# Patient Record
Sex: FEMALE | Race: WHITE | NOT HISPANIC OR LATINO | Employment: OTHER | URBAN - METROPOLITAN AREA
[De-identification: names, ages, dates, MRNs, and addresses within clinical notes are randomized per-mention and may not be internally consistent; named-entity substitution may affect disease eponyms.]

---

## 2018-03-09 ENCOUNTER — CLINICAL SUPPORT (OUTPATIENT)
Dept: PRIMARY CARE CLINIC | Facility: CLINIC | Age: 38
End: 2018-03-09
Payer: MEDICAID

## 2018-03-09 ENCOUNTER — TELEPHONE (OUTPATIENT)
Dept: PRIMARY CARE CLINIC | Facility: CLINIC | Age: 38
End: 2018-03-09

## 2018-03-09 DIAGNOSIS — T14.8XXA PUNCTURE WOUND: Primary | ICD-10-CM

## 2018-03-09 PROCEDURE — 99999 PR PBB SHADOW E&M-EST. PATIENT-LVL II: CPT | Mod: PBBFAC,,,

## 2018-03-09 PROCEDURE — 99212 OFFICE O/P EST SF 10 MIN: CPT | Mod: PBBFAC,PN

## 2018-03-09 PROCEDURE — 90471 IMMUNIZATION ADMIN: CPT | Mod: PBBFAC,PN

## 2018-03-09 NOTE — TELEPHONE ENCOUNTER
----- Message from Fide Flaherty sent at 3/9/2018 12:47 PM CST -----  Pt called yesterday / no responce / needs a tetnus injection ... Asking to be seen with anyone today / [phone]

## 2018-03-09 NOTE — TELEPHONE ENCOUNTER
Spoke with patient to notify her that we do have Tetanus injections. Therefore scheduled appointment for a nurse visit today

## 2018-03-09 NOTE — TELEPHONE ENCOUNTER
----- Message from Marlee Gillespie sent at 3/8/2018 12:40 PM CST -----  Please call patient in regards to requesting a tetanus shot, [phone] (imkv)

## 2018-03-09 NOTE — PROGRESS NOTES
"Pt ID verified by  and Name. Allergies verified. Pt reports that she was stuck by something while in an old [place] yesterday. Pt reports being nervous about getting injection. I notified to pt that she did receive the shot in  according to her [place] registry. Pt denies having any reactions to vaccines in the past. Pt reports that she has PTSD when it comes to "stuff like this." Explained to pt in detail about the Tdap vaccine while reading to her the Vaccination information sheet. Anitra Waddell NP also spoke with pt about the Tdap vaccine. Pt stated, "I just need to do it because I cut stuck by something." I told pt that I was not going to give her the shot until she gave me the ok. I prepped pt with alcohol and administered Tdap vaccine to R Deltoid when she said to. Pt stated after the vaccine was given, "Oh it didn't even feel like I got anything." Pt waited in room 12 with door open for 15 minutes after injection. Pt tolerated well. No reactions noted.   "

## 2018-04-12 ENCOUNTER — TELEPHONE (OUTPATIENT)
Dept: PRIMARY CARE CLINIC | Facility: CLINIC | Age: 38
End: 2018-04-12

## 2018-04-12 DIAGNOSIS — R00.0 TACHYCARDIA: Primary | ICD-10-CM

## 2018-04-12 NOTE — TELEPHONE ENCOUNTER
----- Message from Susan Ramírez sent at 4/12/2018  2:01 PM CDT -----  Contact: self  Patient would like a referral to cardiology. Patient has been to ER regarding heart is beating very fast. Please call patient at [phone].thanks!

## 2018-06-20 ENCOUNTER — TELEPHONE (OUTPATIENT)
Dept: CARDIOLOGY | Facility: CLINIC | Age: 38
End: 2018-06-20

## 2018-06-20 ENCOUNTER — OFFICE VISIT (OUTPATIENT)
Dept: CARDIOLOGY | Facility: CLINIC | Age: 38
End: 2018-06-20
Payer: MEDICAID

## 2018-06-20 VITALS
HEIGHT: 60 IN | BODY MASS INDEX: 27.02 KG/M2 | WEIGHT: 137.63 LBS | SYSTOLIC BLOOD PRESSURE: 105 MMHG | HEART RATE: 91 BPM | DIASTOLIC BLOOD PRESSURE: 57 MMHG

## 2018-06-20 DIAGNOSIS — E87.6 HYPOKALEMIA: ICD-10-CM

## 2018-06-20 DIAGNOSIS — I47.9 PAROXYSMAL TACHYCARDIA: ICD-10-CM

## 2018-06-20 DIAGNOSIS — F43.10 PTSD (POST-TRAUMATIC STRESS DISORDER): ICD-10-CM

## 2018-06-20 PROCEDURE — 99999 PR PBB SHADOW E&M-EST. PATIENT-LVL III: CPT | Mod: PBBFAC,,, | Performed by: INTERNAL MEDICINE

## 2018-06-20 PROCEDURE — 99213 OFFICE O/P EST LOW 20 MIN: CPT | Mod: PBBFAC,PN | Performed by: INTERNAL MEDICINE

## 2018-06-20 PROCEDURE — 99203 OFFICE O/P NEW LOW 30 MIN: CPT | Mod: S$PBB,,, | Performed by: INTERNAL MEDICINE

## 2018-06-20 RX ORDER — SULFAMETHOXAZOLE AND TRIMETHOPRIM 800; 160 MG/1; MG/1
TABLET ORAL
Refills: 0 | COMMUNITY
Start: 2018-06-09 | End: 2018-08-28

## 2018-06-20 NOTE — TELEPHONE ENCOUNTER
----- Message from Demar Sen MD sent at 6/20/2018  2:48 PM CDT -----  Please inform the patient that her lab is all normal including potassium.

## 2018-06-20 NOTE — LETTER
June 20, 2018      Robb Jha MD  [address] [place] [address]           [facility]  [address]  [place] [address]  Phone: [phone]  Fax: [phone]          Patient: Nancy Zambrano   MR Number: [id number]   YOB: 1980   Date of Visit: 6/20/2018       Dear Dr. Robb Jha:    Thank you for referring Nancy Zambrano to me for evaluation. Attached you will find relevant portions of my assessment and plan of care.    If you have questions, please do not hesitate to call me. I look forward to following Nancy Zambrano along with you.    Sincerely,    Demar Sen MD    Enclosure  CC:  No Recipients    If you would like to receive this communication electronically, please contact [email or web address] or [phone] to request more information on [email or web address] access.    For providers and/or their staff who would like to refer a patient to [facility], please contact us through our one-stop-shop provider referral line, [facility], at [phone].    If you feel you have received this communication in error or would no longer like to receive these types of communications, please e-mail [email or web address]

## 2018-06-20 NOTE — PROGRESS NOTES
Subjective:   Patient ID:  Nancy Zambrano is a 37 y.o. female who presents for evaluation of Tachycardia      HPI:   Nancy Zambrano presents for evaluation of recurrent heart racing. The patient has had 9 ED visits in the past 6 months for anxiety, orthostatic sxs, and tachycardia. She notes that for no apparent reason her heart will begin beating rapidly. Per ED records she has had orthostatic sxs with dizziness  during the episodes. Indicates the episodes will suddenly start and stop. Heart rates have varied from 130-180/. Has 2 episodes daily.  No exertional sxs. She has worn a 48 hour Holter monitor but has not as yet turned it in. She is vaping having stopped smoking. She has PTSD and during some episodes has had perioral paresthesias.   Review of Systems   Constitution: Negative for weakness, malaise/fatigue, weight gain and weight loss.   Eyes: Negative for blurred vision.   Cardiovascular: Positive for palpitations. Negative for chest pain, claudication, cyanosis, dyspnea on exertion, irregular heartbeat, leg swelling, near-syncope, orthopnea, paroxysmal nocturnal dyspnea and syncope.   Respiratory: Negative for cough, shortness of breath and wheezing.    Musculoskeletal: Negative for falls and myalgias.        Scoliosis.   Gastrointestinal: Positive for dysphagia. Negative for abdominal pain, heartburn, nausea and vomiting.   Genitourinary: Negative for nocturia.   Neurological: Positive for dizziness, headaches and paresthesias. Negative for brief paralysis, focal weakness and numbness.   Psychiatric/Behavioral: Negative for altered mental status. The patient is nervous/anxious.        Current Outpatient Prescriptions   Medication Sig    diazePAM (VALIUM) 10 MG Tab Take 5 mg by mouth every 6 (six) hours as needed.    oxyCODONE-acetaminophen (PERCOCET) 7.5-325 mg per tablet Take 1 tablet by mouth every 6 (six) hours as needed.    sulfamethoxazole-trimethoprim 800-160mg (BACTRIM DS) 800-160 mg Tab  TK 1 T PO  BID     No current facility-administered medications for this visit.      Objective:   Physical Exam   Constitutional: She is oriented to person, place, and time. She appears well-developed. No distress.   BP (!) 105/57 (BP Location: Left arm)   Pulse 91   Ht 5' (1.524 m)   Wt 62.4 kg (137 lb 9.6 oz)   BMI 26.87 kg/m²    HENT:   Head: Normocephalic.   Eyes: Conjunctivae are normal. Pupils are equal, round, and reactive to light. No scleral icterus.   Neck: Neck supple. No JVD present. No thyromegaly present.   Cardiovascular: Normal rate, regular rhythm, normal heart sounds and intact distal pulses.  PMI is not displaced.  Exam reveals no gallop and no friction rub.    No murmur heard.  Pulmonary/Chest: Effort normal and breath sounds normal. No respiratory distress. She has no wheezes. She has no rales.   Abdominal: Soft. She exhibits no distension. There is no splenomegaly or hepatomegaly. There is no tenderness.   Musculoskeletal: She exhibits no edema or tenderness.   Gait normal   Neurological: She is alert and oriented to person, place, and time.   Skin: Skin is warm and dry. She is not diaphoretic.   Psychiatric: She has a normal mood and affect. Her behavior is normal.       Lab Results   Component Value Date     05/28/2018    K 3.3 (L) 05/28/2018     05/28/2018    CO2 25 05/28/2018    BUN 10 05/28/2018    CREATININE 0.8 05/28/2018    GLU 99 05/28/2018    MG 2.0 04/30/2018    AST 17 05/28/2018    ALT 14 05/28/2018    ALBUMIN 4.3 05/28/2018    PROT 7.7 05/28/2018    BILITOT 0.1 (L) 05/28/2018    WBC 10.10 05/28/2018    HGB 12.5 05/28/2018    HCT 36.9 (L) 05/28/2018    MCV 87 05/28/2018     05/28/2018    TSH 1.180 05/10/2018       Assessment:     1. Paroxysmal tachycardia : Consider PSVT but may be symptomatic sinus tachycardia   2. Hypokalemia ; K+ low last 2 ED visits   3. PTSD (post-traumatic stress disorder)        Plan:     Nancy was seen today for  tachycardia.    Diagnoses and all orders for this visit:    Paroxysmal tachycardia    -     Transthoracic echo (TTE) complete ([facility] ONLY); Future  Awaiting the results of the Holter. If PSVT, will refer to EP.  Discussed weaning off all Nicotine.  Hypokalemia  -     Basic metabolic panel; Future; Expected date: 06/20/2018  -     Magnesium; Future; Expected date: 06/20/2018    PTSD (post-traumatic stress disorder)    Other orders  -     sulfamethoxazole-trimethoprim 800-160mg (BACTRIM DS) 800-160 mg Tab; TK 1 T PO  BID

## 2018-06-25 ENCOUNTER — TELEPHONE (OUTPATIENT)
Dept: CARDIOLOGY | Facility: CLINIC | Age: 38
End: 2018-06-25

## 2018-06-25 NOTE — TELEPHONE ENCOUNTER
----- Message from Arianna Boyer MA sent at 6/20/2018 10:37 AM CDT -----  Check and make sure Holter monitor has been dropped off and uploaded and resulted to Dr Sen.

## 2018-06-25 NOTE — TELEPHONE ENCOUNTER
Spoke with patient. Stated that she turned the monitor in at the [facility] during a visit this weekend.

## 2019-05-22 ENCOUNTER — TELEPHONE (OUTPATIENT)
Dept: PRIMARY CARE CLINIC | Facility: CLINIC | Age: 39
End: 2019-05-22

## 2019-05-22 RX ORDER — SULFAMETHOXAZOLE AND TRIMETHOPRIM 800; 160 MG/1; MG/1
1 TABLET ORAL 2 TIMES DAILY
Qty: 30 TABLET | Refills: 0 | OUTPATIENT
Start: 2019-05-22

## 2019-05-22 NOTE — TELEPHONE ENCOUNTER
----- Message from Susan Ramírez sent at 5/22/2019  3:51 PM CDT -----  Contact: self  Type:  RX Refill Request    Who Called:  self  Refill or New Rx:  refill  RX Name and Strength:   Bactrim  How is the patient currently taking it? (ex. 1XDay):    Is this a 30 day or 90 day RX:    Preferred Pharmacy with phone number:  [facility]   Local or Mail Order:  local  Ordering Provider:  Dr Abhijeet Pineda Call Back Number:  [phone]  Additional Information:  Patient left a previous message and would like a call back regarding the medication. Patient states pharmacy sent request.  Thanks!

## 2019-05-22 NOTE — TELEPHONE ENCOUNTER
----- Message from Fide Swain sent at 5/22/2019 12:37 PM CDT -----  Contact: Patient  Type:  RX Refill Request    Who Called:  Nancy patient  Refill or New Rx:  Refill  RX Name and Strength:  Bactrim  How is the patient currently taking it? (ex. 1XDay):  1 tablet ever 12 hours  Is this a 30 day or 90 day RX:  30  Preferred Pharmacy with phone number:   Phone: [phone] Fax: [phone]  [facility]  Local or Mail Order:  Local  Ordering Provider:  Dr Abhijeet Pineda Call Back Number:  [phone]  Additional Information:  Please advise. Thanks.

## 2019-05-23 NOTE — TELEPHONE ENCOUNTER
Let pt know she has not been seen in office more than 2 -3 yrs she need f/u in office and have labs U/A before treatments

## 2019-09-24 ENCOUNTER — TELEPHONE (OUTPATIENT)
Dept: PRIMARY CARE CLINIC | Facility: CLINIC | Age: 39
End: 2019-09-24

## 2019-09-24 DIAGNOSIS — Z91.199 NONCOMPLIANCE: Primary | ICD-10-CM

## 2021-11-12 ENCOUNTER — TELEPHONE (OUTPATIENT)
Dept: OBSTETRICS AND GYNECOLOGY | Facility: CLINIC | Age: 41
End: 2021-11-12
Payer: MEDICAID

## 2021-11-19 ENCOUNTER — OFFICE VISIT (OUTPATIENT)
Dept: OBSTETRICS AND GYNECOLOGY | Facility: CLINIC | Age: 41
End: 2021-11-19
Payer: MEDICAID

## 2021-11-19 VITALS
HEIGHT: 61 IN | DIASTOLIC BLOOD PRESSURE: 76 MMHG | SYSTOLIC BLOOD PRESSURE: 118 MMHG | WEIGHT: 133.19 LBS | BODY MASS INDEX: 25.14 KG/M2

## 2021-11-19 DIAGNOSIS — R53.83 FATIGUE, UNSPECIFIED TYPE: ICD-10-CM

## 2021-11-19 DIAGNOSIS — Z01.419 WELL WOMAN EXAM: Primary | ICD-10-CM

## 2021-11-19 PROCEDURE — 99999 PR PBB SHADOW E&M-EST. PATIENT-LVL III: CPT | Mod: PBBFAC,,, | Performed by: STUDENT IN AN ORGANIZED HEALTH CARE EDUCATION/TRAINING PROGRAM

## 2021-11-19 PROCEDURE — 99213 OFFICE O/P EST LOW 20 MIN: CPT | Mod: PBBFAC,PN | Performed by: STUDENT IN AN ORGANIZED HEALTH CARE EDUCATION/TRAINING PROGRAM

## 2021-11-19 PROCEDURE — 99386 PREV VISIT NEW AGE 40-64: CPT | Mod: S$PBB,,, | Performed by: STUDENT IN AN ORGANIZED HEALTH CARE EDUCATION/TRAINING PROGRAM

## 2021-11-19 PROCEDURE — 87624 HPV HI-RISK TYP POOLED RSLT: CPT | Performed by: STUDENT IN AN ORGANIZED HEALTH CARE EDUCATION/TRAINING PROGRAM

## 2021-11-19 PROCEDURE — 99386 PR PREVENTIVE VISIT,NEW,40-64: ICD-10-PCS | Mod: S$PBB,,, | Performed by: STUDENT IN AN ORGANIZED HEALTH CARE EDUCATION/TRAINING PROGRAM

## 2021-11-19 PROCEDURE — 88175 CYTOPATH C/V AUTO FLUID REDO: CPT | Performed by: STUDENT IN AN ORGANIZED HEALTH CARE EDUCATION/TRAINING PROGRAM

## 2021-11-19 PROCEDURE — 99999 PR PBB SHADOW E&M-EST. PATIENT-LVL III: ICD-10-PCS | Mod: PBBFAC,,, | Performed by: STUDENT IN AN ORGANIZED HEALTH CARE EDUCATION/TRAINING PROGRAM

## 2021-11-24 LAB
HPV HR 12 DNA SPEC QL NAA+PROBE: NEGATIVE
HPV16 AG SPEC QL: NEGATIVE
HPV18 DNA SPEC QL NAA+PROBE: NEGATIVE

## 2021-11-26 LAB
FINAL PATHOLOGIC DIAGNOSIS: NORMAL
Lab: NORMAL

## 2021-12-14 ENCOUNTER — LAB VISIT (OUTPATIENT)
Dept: LAB | Facility: HOSPITAL | Age: 41
End: 2021-12-14
Attending: STUDENT IN AN ORGANIZED HEALTH CARE EDUCATION/TRAINING PROGRAM
Payer: MEDICAID

## 2021-12-14 ENCOUNTER — HOSPITAL ENCOUNTER (OUTPATIENT)
Dept: RADIOLOGY | Facility: HOSPITAL | Age: 41
Discharge: HOME | End: 2021-12-14
Attending: STUDENT IN AN ORGANIZED HEALTH CARE EDUCATION/TRAINING PROGRAM
Payer: MEDICAID

## 2021-12-14 DIAGNOSIS — Z01.419 WELL WOMAN EXAM: ICD-10-CM

## 2021-12-14 DIAGNOSIS — R79.89 ABNORMAL TSH: ICD-10-CM

## 2021-12-14 DIAGNOSIS — Z12.31 VISIT FOR SCREENING MAMMOGRAM: ICD-10-CM

## 2021-12-14 DIAGNOSIS — R53.83 FATIGUE, UNSPECIFIED TYPE: Primary | ICD-10-CM

## 2021-12-14 LAB
T4 FREE SERPL-MCNC: 0.81 NG/DL (ref 0.71–1.51)
TSH SERPL DL<=0.005 MIU/L-ACNC: 6.04 UIU/ML (ref 0.4–4)

## 2021-12-14 PROCEDURE — 77063 MAMMO DIGITAL SCREENING BILAT WITH TOMO: ICD-10-PCS | Mod: 26,,, | Performed by: RADIOLOGY

## 2021-12-14 PROCEDURE — 77067 MAMMO DIGITAL SCREENING BILAT WITH TOMO: ICD-10-PCS | Mod: 26,,, | Performed by: RADIOLOGY

## 2021-12-14 PROCEDURE — 77063 BREAST TOMOSYNTHESIS BI: CPT | Mod: 26,,, | Performed by: RADIOLOGY

## 2021-12-14 PROCEDURE — 36415 COLL VENOUS BLD VENIPUNCTURE: CPT | Mod: PN | Performed by: STUDENT IN AN ORGANIZED HEALTH CARE EDUCATION/TRAINING PROGRAM

## 2021-12-14 PROCEDURE — 77067 SCR MAMMO BI INCL CAD: CPT | Mod: 26,,, | Performed by: RADIOLOGY

## 2021-12-14 PROCEDURE — 84443 ASSAY THYROID STIM HORMONE: CPT | Performed by: STUDENT IN AN ORGANIZED HEALTH CARE EDUCATION/TRAINING PROGRAM

## 2021-12-14 PROCEDURE — 84439 ASSAY OF FREE THYROXINE: CPT | Performed by: STUDENT IN AN ORGANIZED HEALTH CARE EDUCATION/TRAINING PROGRAM

## 2021-12-14 PROCEDURE — 77067 SCR MAMMO BI INCL CAD: CPT | Mod: TC,PN

## 2021-12-15 ENCOUNTER — TELEPHONE (OUTPATIENT)
Dept: OBSTETRICS AND GYNECOLOGY | Facility: CLINIC | Age: 41
End: 2021-12-15
Payer: MEDICAID

## 2022-05-19 ENCOUNTER — TELEPHONE (OUTPATIENT)
Dept: CARDIOLOGY | Facility: CLINIC | Age: 42
End: 2022-05-19
Payer: MEDICAID

## 2022-05-19 NOTE — TELEPHONE ENCOUNTER
----- Message from Dae Moran sent at 5/19/2022  9:27 AM CDT -----  Contact: pt  Type: Needs Medical Advice    Who Called:pt  Best Call Back Number:[phone]    Additional Information: Requesting callback regarding needing to est care with any cv dr in [facility] please call back to make an appt    Please Advise-Thank you

## 2022-09-13 ENCOUNTER — OFFICE VISIT (OUTPATIENT)
Dept: CARDIOLOGY | Facility: CLINIC | Age: 42
End: 2022-09-13
Payer: MEDICAID

## 2022-09-13 VITALS
HEART RATE: 98 BPM | OXYGEN SATURATION: 98 % | SYSTOLIC BLOOD PRESSURE: 121 MMHG | HEIGHT: 61 IN | DIASTOLIC BLOOD PRESSURE: 60 MMHG | BODY MASS INDEX: 23.06 KG/M2 | WEIGHT: 122.13 LBS

## 2022-09-13 DIAGNOSIS — F17.200 SMOKING: Primary | ICD-10-CM

## 2022-09-13 DIAGNOSIS — I47.9 PAROXYSMAL TACHYCARDIA: ICD-10-CM

## 2022-09-13 DIAGNOSIS — R00.2 PALPITATIONS: ICD-10-CM

## 2022-09-13 PROCEDURE — 99213 OFFICE O/P EST LOW 20 MIN: CPT | Mod: PBBFAC,PN | Performed by: NURSE PRACTITIONER

## 2022-09-13 PROCEDURE — 99999 PR PBB SHADOW E&M-EST. PATIENT-LVL III: CPT | Mod: PBBFAC,,, | Performed by: NURSE PRACTITIONER

## 2022-09-13 PROCEDURE — 1160F PR REVIEW ALL MEDS BY PRESCRIBER/CLIN PHARMACIST DOCUMENTED: ICD-10-PCS | Mod: CPTII,,, | Performed by: NURSE PRACTITIONER

## 2022-09-13 PROCEDURE — 3074F SYST BP LT 130 MM HG: CPT | Mod: CPTII,,, | Performed by: NURSE PRACTITIONER

## 2022-09-13 PROCEDURE — 99203 PR OFFICE/OUTPT VISIT, NEW, LEVL III, 30-44 MIN: ICD-10-PCS | Mod: S$PBB,,, | Performed by: NURSE PRACTITIONER

## 2022-09-13 PROCEDURE — 3008F PR BODY MASS INDEX (BMI) DOCUMENTED: ICD-10-PCS | Mod: CPTII,,, | Performed by: NURSE PRACTITIONER

## 2022-09-13 PROCEDURE — 93010 ELECTROCARDIOGRAM REPORT: CPT | Mod: S$PBB,,, | Performed by: INTERNAL MEDICINE

## 2022-09-13 PROCEDURE — 99203 OFFICE O/P NEW LOW 30 MIN: CPT | Mod: S$PBB,,, | Performed by: NURSE PRACTITIONER

## 2022-09-13 PROCEDURE — 3078F DIAST BP <80 MM HG: CPT | Mod: CPTII,,, | Performed by: NURSE PRACTITIONER

## 2022-09-13 PROCEDURE — 1160F RVW MEDS BY RX/DR IN RCRD: CPT | Mod: CPTII,,, | Performed by: NURSE PRACTITIONER

## 2022-09-13 PROCEDURE — 3078F PR MOST RECENT DIASTOLIC BLOOD PRESSURE < 80 MM HG: ICD-10-PCS | Mod: CPTII,,, | Performed by: NURSE PRACTITIONER

## 2022-09-13 PROCEDURE — 1159F MED LIST DOCD IN RCRD: CPT | Mod: CPTII,,, | Performed by: NURSE PRACTITIONER

## 2022-09-13 PROCEDURE — 3074F PR MOST RECENT SYSTOLIC BLOOD PRESSURE < 130 MM HG: ICD-10-PCS | Mod: CPTII,,, | Performed by: NURSE PRACTITIONER

## 2022-09-13 PROCEDURE — 99999 PR PBB SHADOW E&M-EST. PATIENT-LVL III: ICD-10-PCS | Mod: PBBFAC,,, | Performed by: NURSE PRACTITIONER

## 2022-09-13 PROCEDURE — 1159F PR MEDICATION LIST DOCUMENTED IN MEDICAL RECORD: ICD-10-PCS | Mod: CPTII,,, | Performed by: NURSE PRACTITIONER

## 2022-09-13 PROCEDURE — 3008F BODY MASS INDEX DOCD: CPT | Mod: CPTII,,, | Performed by: NURSE PRACTITIONER

## 2022-09-13 PROCEDURE — 93005 ELECTROCARDIOGRAM TRACING: CPT | Mod: PBBFAC,PN | Performed by: INTERNAL MEDICINE

## 2022-09-13 PROCEDURE — 93010 EKG 12-LEAD: ICD-10-PCS | Mod: S$PBB,,, | Performed by: INTERNAL MEDICINE

## 2022-09-13 RX ORDER — LEVOTHYROXINE SODIUM 25 UG/1
25 TABLET ORAL EVERY MORNING
COMMUNITY
Start: 2022-09-10

## 2022-09-13 NOTE — PROGRESS NOTES
Cardiology    2022  10:54 AM    Problem list  Patient Active Problem List   Diagnosis    Paroxysmal tachycardia    Hypokalemia    PTSD (post-traumatic stress disorder)       CC:  Palpitations    HPI:  SoB, tachycardic, BP gets very low and she has symptoms.  Saw a cardiologist here about 10 years ago and was supposed to do a Holter but was unable to complete.  The most recent Holter was [facility] ~4 years ago. Did not get the results.  Will notice that he symptoms are worse at times, stress can contribute.  No recs were made for medication- was told that her tachycardia was nothing to worry about at the time. Her BP was low recently and she has some sodium and increased her activity in an effort to increase it.  Her BP will go up to a normal level when she smokes, when she quits smoking. Current daily smoker, about 1/2 a pack, no etoh, has 2 triple shot espresso drinks daily. Father had pacemaker, grandmothers had heart disease, one uncle under 50  of MI before 50. Does have pauses in sleep, has not had a sleep study, does not feel well rested.  Goes to sleep early, wakes up rested and then will feel more tired.  Has been to ED for lethargy and was dehydrated. No chest pain but did have a muscle spasm in her chest and lower blood pressure.  Pain was sharp and on right side.  Was breathtaking    Medications  Current Outpatient Medications   Medication Sig Dispense Refill    diazePAM (VALIUM) 10 MG Tab Take 5 mg by mouth every 6 (six) hours as needed.      HYDROcodone-acetaminophen (NORCO) 7.5-325 mg per tablet Take 1 tablet by mouth every 6 (six) hours as needed for Pain.      levothyroxine (SYNTHROID) 25 MCG tablet Take 25 mcg by mouth every morning.       No current facility-administered medications for this visit.      Prior to Admission medications    Medication Sig Start Date End Date Taking? Authorizing Provider   diazePAM (VALIUM) 10 MG Tab Take 5 mg by mouth every 6 (six)  hours as needed. 18  Yes Historical Provider   HYDROcodone-acetaminophen (NORCO) 7.5-325 mg per tablet Take 1 tablet by mouth every 6 (six) hours as needed for Pain.   Yes Historical Provider   levothyroxine (SYNTHROID) 25 MCG tablet Take 25 mcg by mouth every morning. 9/10/22  Yes Historical Provider   albuterol (PROVENTIL/VENTOLIN HFA) 90 mcg/actuation inhaler Inhale 1-2 puffs into the lungs every 6 (six) hours as needed for Wheezing or Shortness of Breath. Rescue  Patient not taking: Reported on 2021  Jocelyn Muarice MD   aluminum & magnesium hydroxide-simethicone (MYLANTA MAX STRENGTH) 400-400-40 mg/5 mL suspension Take by mouth every 6 (six) hours as needed for Indigestion.  22  Historical Provider   azelastine-fluticasone (DYMISTA) 137-50 mcg/spray Spry nassal spray 1 spray by Each Nare route 2 (two) times daily.  Patient not taking: Reported on 2021  Anmol Payan MD   CMPD aminophylline 3%- L-Arginine 6% topical cream (SCREAM CREAM) Apply topically as needed (for libidio). Apply pea sized amount to the clitoris before intercourse. 21  Stephania Connelly MD   ibuprofen (ADVIL,MOTRIN) 600 MG tablet Take 1 tablet (600 mg total) by mouth every 6 (six) hours as needed for Pain. Take with food  Patient not taking: Reported on 2021  Tammy Mcconnell MD   ibuprofen (ADVIL,MOTRIN) 600 MG tablet Take 1 tablet (600 mg total) by mouth every 6 (six) hours as needed for Pain.  Patient not taking: Reported on 2021  Genny Wyatt MD   omeprazole (PRILOSEC) 20 MG capsule Take 20 mg by mouth once daily.  22  Historical Provider         History  Past Medical History:   Diagnosis Date    Anxiety     GERD (gastroesophageal reflux disease)     Headache      Past Surgical History:   Procedure Laterality Date     SECTION, CLASSIC      STERILIZATION Bilateral     ESSURE procedure     Social History      Socioeconomic History    Marital status: Single   Tobacco Use    Smoking status: Every Day     Packs/day: 0.50     Types: Cigarettes     Last attempt to quit: 2018     Years since quittin.3    Smokeless tobacco: Never   Substance and Sexual Activity    Alcohol use: Yes     Comment: social    Drug use: No    Sexual activity: Yes     Birth control/protection: None         Allergies  Review of patient's allergies indicates:   Allergen Reactions    Ampicillin-sulbactam Hives    Ciprofloxacin Other (See Comments)     Dry mouth, odd feeling         Review of Systems   Review of Systems   Constitutional: Positive for malaise/fatigue. Negative for diaphoresis.   HENT: Negative.     Cardiovascular:  Positive for irregular heartbeat, leg swelling (akles sometiems puffy), near-syncope and palpitations. Negative for chest pain, claudication, dyspnea on exertion, orthopnea, paroxysmal nocturnal dyspnea and syncope.   Respiratory:  Negative for shortness of breath.    Endocrine: Negative for polydipsia, polyphagia and polyuria.   Hematologic/Lymphatic: Does not bruise/bleed easily.   Gastrointestinal:  Negative for bloating, nausea and vomiting.   Genitourinary: Negative.    Neurological:  Positive for dizziness. Negative for excessive daytime sleepiness, light-headedness, loss of balance and weakness.   Psychiatric/Behavioral:  The patient is not nervous/anxious.    Allergic/Immunologic: Negative.        Physical Exam  Wt Readings from Last 1 Encounters:   22 55.4 kg (122 lb 2.2 oz)     BP Readings from Last 3 Encounters:   22 121/60   21 118/76   21 (!) 111/52     Pulse Readings from Last 1 Encounters:   22 98     Body mass index is 23.08 kg/m².    Physical Exam  Vitals and nursing note reviewed.   Constitutional:       Appearance: Normal appearance.   HENT:      Head: Normocephalic and atraumatic.      Mouth/Throat:      Mouth: Mucous membranes are moist.   Eyes:      Pupils: Pupils  are equal, round, and reactive to light.   Cardiovascular:      Rate and Rhythm: Normal rate and regular rhythm.      Pulses:           Radial pulses are 2+ on the right side and 2+ on the left side.        Dorsalis pedis pulses are 2+ on the right side and 2+ on the left side.        Posterior tibial pulses are 2+ on the right side and 2+ on the left side.      Heart sounds: No murmur heard.  Pulmonary:      Effort: Pulmonary effort is normal. No respiratory distress.      Breath sounds: Normal breath sounds.   Abdominal:      General: There is no distension.      Tenderness: There is no abdominal tenderness.   Musculoskeletal:      Cervical back: Normal range of motion.      Right lower leg: No edema.      Left lower leg: No edema.   Skin:     General: Skin is warm and dry.      Findings: No erythema.   Neurological:      General: No focal deficit present.      Mental Status: She is alert.   Psychiatric:         Mood and Affect: Mood normal.         Behavior: Behavior normal.       Problem List Items Addressed This Visit          Cardiac/Vascular    Paroxysmal tachycardia    Overview     Await Holter- this was not completed.   Also recommend echo  Avoid caffeine- this may be contributing.   Await labs and further testing           Current Assessment & Plan     As above          Other Visit Diagnoses       Smoking    -  Primary    Relevant Orders    Ambulatory referral/consult to Smoking Cessation Program    Palpitations        Relevant Orders    TSH (Completed)    T4, FREE (Completed)    Holter monitor - 48 hour    Echo    CBC W/ AUTO DIFFERENTIAL (Completed)    COMPREHENSIVE METABOLIC PANEL (Completed)    LIPID PANEL (Completed)    IN OFFICE EKG 12-LEAD (to [place])                        Follow Up  4 weeks      @Malina Cary DNP

## 2022-09-16 ENCOUNTER — TELEPHONE (OUTPATIENT)
Dept: CARDIOLOGY | Facility: CLINIC | Age: 42
End: 2022-09-16
Payer: MEDICAID

## 2022-09-16 NOTE — TELEPHONE ENCOUNTER
"Patient notified.       ----- Message from Malina Cary DNP sent at 9/16/2022 11:11 AM CDT -----  Overall blood work looked good.  Cholesterol ok, TSH normal, no problems with diabetes or kidney isses.    ----- Message -----  From: Feliciano Ortiz MA  Sent: 9/14/2022   9:58 AM CDT  To: Malina Cary DNP    Please advise patient told that you have not seen them yet.  ----- Message -----  From: Spring Mascorro  Sent: 9/14/2022   8:55 AM CDT  To: [facility]ae[facility] Staff    Consult/Advisory:           Name Of Caller: self    Contact Preference?:  [phone]    What is the nature of the call?: requesting a call back to discuss lab results           Additional Notes:  "Thank you for all that you do for our patients'"         "

## 2022-09-22 ENCOUNTER — PATIENT MESSAGE (OUTPATIENT)
Dept: CARDIOLOGY | Facility: CLINIC | Age: 42
End: 2022-09-22
Payer: MEDICAID

## 2022-09-28 ENCOUNTER — TELEPHONE (OUTPATIENT)
Dept: CARDIOLOGY | Facility: CLINIC | Age: 42
End: 2022-09-28
Payer: MEDICAID

## 2022-09-28 NOTE — TELEPHONE ENCOUNTER
----- Message from Tali Bach RN sent at 9/28/2022  3:41 PM CDT -----    ----- Message -----  From: Francisco Valencia MD  Sent: 9/27/2022   6:17 PM CDT  To: Leatha Benz